# Patient Record
Sex: MALE | Race: WHITE | NOT HISPANIC OR LATINO
[De-identification: names, ages, dates, MRNs, and addresses within clinical notes are randomized per-mention and may not be internally consistent; named-entity substitution may affect disease eponyms.]

---

## 2018-12-05 ENCOUNTER — TRANSCRIPTION ENCOUNTER (OUTPATIENT)
Age: 58
End: 2018-12-05

## 2019-01-02 ENCOUNTER — TRANSCRIPTION ENCOUNTER (OUTPATIENT)
Age: 59
End: 2019-01-02

## 2019-03-19 PROBLEM — Z00.00 ENCOUNTER FOR PREVENTIVE HEALTH EXAMINATION: Status: ACTIVE | Noted: 2019-03-19

## 2019-03-21 ENCOUNTER — APPOINTMENT (OUTPATIENT)
Age: 59
End: 2019-03-21
Payer: COMMERCIAL

## 2019-03-21 VITALS
OXYGEN SATURATION: 96 % | BODY MASS INDEX: 24.91 KG/M2 | WEIGHT: 174.01 LBS | SYSTOLIC BLOOD PRESSURE: 121 MMHG | TEMPERATURE: 97.6 F | HEIGHT: 70 IN | HEART RATE: 77 BPM | DIASTOLIC BLOOD PRESSURE: 78 MMHG

## 2019-03-21 DIAGNOSIS — M79.9 SOFT TISSUE DISORDER, UNSPECIFIED: ICD-10-CM

## 2019-03-21 PROCEDURE — 99203 OFFICE O/P NEW LOW 30 MIN: CPT

## 2019-03-21 NOTE — REASON FOR VISIT
[FreeTextEntry1] : Presented for evaluation of a large soft tissue mass involving the left distal forearm

## 2019-03-21 NOTE — PHYSICAL EXAM
[FreeTextEntry1] : Physical exam revealed a healthy-looking patient in no apparent distress patient appeared to be fully alert oriented and drainage of the left upper extremity demonstrates a large fixed no mobile soft tissue mass involving the dorsal lateral and volar aspect of the distal forearm extending to the wrist area no gross and neurovascular deficit MRI scan demonstrated multilobulated soft tissue mass rubbing around the distal or not. Suggest to be a lipomatous mass. At this stage this condition was discussed with the patient was recommended to proceed with exploration wide resection soft tissue mass left distal followup

## 2019-03-21 NOTE — HISTORY OF PRESENT ILLNESS
[FreeTextEntry1] : This is the first visit of 58 years old male for the last 20 years noted to have gradually slow growing mass involving the lateral aspect of the left distal forearm wrist area recent MRI scan suggested a large multilobulated lipomatous mass

## 2022-08-11 ENCOUNTER — APPOINTMENT (OUTPATIENT)
Dept: ORTHOPEDIC SURGERY | Facility: CLINIC | Age: 62
End: 2022-08-11

## 2022-08-11 VITALS — WEIGHT: 180 LBS | BODY MASS INDEX: 25.77 KG/M2 | HEIGHT: 70 IN

## 2022-08-11 DIAGNOSIS — Z78.9 OTHER SPECIFIED HEALTH STATUS: ICD-10-CM

## 2022-08-11 DIAGNOSIS — S96.911A STRAIN OF UNSPECIFIED MUSCLE AND TENDON AT ANKLE AND FOOT LEVEL, RIGHT FOOT, INITIAL ENCOUNTER: ICD-10-CM

## 2022-08-11 PROCEDURE — 99214 OFFICE O/P EST MOD 30 MIN: CPT

## 2022-08-11 PROCEDURE — 73630 X-RAY EXAM OF FOOT: CPT | Mod: RT

## 2022-08-11 NOTE — ASSESSMENT
[FreeTextEntry1] : MRI RT foot to evaluate for occult 5th metatarsal fracture.\par Ice to affected area.\par NSAIDS prn.\par \par Discussed CAM boot immobilization, patient declines.

## 2022-08-11 NOTE — HISTORY OF PRESENT ILLNESS
[Sudden] : sudden [8] : 8 [5] : 5 [Sharp] : sharp [Constant] : constant [Household chores] : household chores [Rest] : rest [Ice] : ice [Standing] : standing [Walking] : walking [Full time] : Work status: full time [de-identified] : Pt. is a 62 year old male who presents for evaluation of his RT foot. Denies trauma. Symptoms since late July 2022. WB in supportive footwear. No previous injury/problem with RT foot. No formal treatment to date.  [] : no [FreeTextEntry1] : right foot  [FreeTextEntry3] : 07/2022 [FreeTextEntry5] : n/a  [de-identified] :

## 2022-08-12 ENCOUNTER — FORM ENCOUNTER (OUTPATIENT)
Age: 62
End: 2022-08-12

## 2022-08-13 ENCOUNTER — APPOINTMENT (OUTPATIENT)
Dept: MRI IMAGING | Facility: CLINIC | Age: 62
End: 2022-08-13

## 2022-08-13 PROCEDURE — 73718 MRI LOWER EXTREMITY W/O DYE: CPT | Mod: RT

## 2022-08-16 ENCOUNTER — APPOINTMENT (OUTPATIENT)
Dept: ORTHOPEDIC SURGERY | Facility: CLINIC | Age: 62
End: 2022-08-16

## 2022-08-16 VITALS — HEIGHT: 70 IN | BODY MASS INDEX: 25.77 KG/M2 | WEIGHT: 180 LBS

## 2022-08-16 DIAGNOSIS — S93.621D SPRAIN OF TARSOMETATARSAL LIGAMENT OF RIGHT FOOT, SUBSEQUENT ENCOUNTER: ICD-10-CM

## 2022-08-16 DIAGNOSIS — S93.629A SPRAIN OF TARSOMETATARSAL LIGAMENT OF UNSPECIFIED FOOT, INITIAL ENCOUNTER: ICD-10-CM

## 2022-08-16 PROCEDURE — 99214 OFFICE O/P EST MOD 30 MIN: CPT

## 2022-08-16 RX ORDER — NAPROXEN 500 MG/1
500 TABLET ORAL
Qty: 60 | Refills: 0 | Status: ACTIVE | COMMUNITY
Start: 2022-08-16 | End: 1900-01-01

## 2022-08-16 NOTE — DISCUSSION/SUMMARY
[de-identified] : Patient and I discussed their symptoms, RT foot pain . Discussed findings of today's exam and possible causes of patient's pain. Educated patient on their most probable diagnosis of Lisfranc sprain of RT foot. Reviewed possible courses of treatment, and we collaboratively decided best course of treatment at this time will include:\par \par 1. Immobilize foot\par 2. PT\par 3. Naproxen\par \par Instructions: Dx / Natural History\par The patient was advised of the diagnosis.  The natural history of the pathology was explained in full to the patient in layman's terms.  Several different treatment options were discussed and explained in full to the patient including the risks and benefits of both surgical and non-surgical treatments.  All questions and concerns were answered. \par \par RICE\par I explained to the patient that rest, ice, compression, and elevation would benefit them.  They may return to activity after follow-up or when they no longer have any pain.\par \par NSAIDs - OTC\par Patient is to begin over the counter oral anti-inflammatory medications on an as needed basis, as long as there are no medical contraindications.  Patient is counseled on possible GI and blood pressure side effects.\par \par Pain Guide Activities\par The patient was advised to let pain guide the gradual advancement of activities.\par \par Icing\par The patient was advised to apply ice (wrapped in a towel or protective covering) to the area daily (20 minutes at a time, 2-4X/day).\par \par Follow up in [4-6] weeks.\par \par All of the patient's questions were answered to His satisfaction. Diagnoses and potential treatments were reviewed. He agreed with the plan and would like to move forward with it. \par \par Documented by Jerod Olvera.

## 2022-08-16 NOTE — PHYSICAL EXAM
[NL (40)] : plantar flexion 40 degrees [NL 30)] : inversion 30 degrees [NL (20)] : eversion 20 degrees [5___] : [facility] 5[unfilled]/5 [2+] : posterior tibialis pulse: 2+ [Normal] : saphenous nerve sensation normal [] : patient ambulates without assistive device [Right] : right foot [Weight -] : weightbearing [FreeTextEntry3] : Minimal lateral foot swelling.  [de-identified] : Achilles insertion spur

## 2022-08-16 NOTE — DISCUSSION/SUMMARY
[de-identified] : Patient and I discussed their symptoms, RT foot pain . Discussed findings of today's exam and possible causes of patient's pain. Educated patient on their most probable diagnosis of Lisfranc sprain of RT foot. Reviewed possible courses of treatment, and we collaboratively decided best course of treatment at this time will include:\par \par 1. Immobilize foot\par 2. PT\par 3. Naproxen\par \par Instructions: Dx / Natural History\par The patient was advised of the diagnosis.  The natural history of the pathology was explained in full to the patient in layman's terms.  Several different treatment options were discussed and explained in full to the patient including the risks and benefits of both surgical and non-surgical treatments.  All questions and concerns were answered. \par \par RICE\par I explained to the patient that rest, ice, compression, and elevation would benefit them.  They may return to activity after follow-up or when they no longer have any pain.\par \par NSAIDs - OTC\par Patient is to begin over the counter oral anti-inflammatory medications on an as needed basis, as long as there are no medical contraindications.  Patient is counseled on possible GI and blood pressure side effects.\par \par Pain Guide Activities\par The patient was advised to let pain guide the gradual advancement of activities.\par \par Icing\par The patient was advised to apply ice (wrapped in a towel or protective covering) to the area daily (20 minutes at a time, 2-4X/day).\par \par Follow up in [4-6] weeks.\par \par All of the patient's questions were answered to His satisfaction. Diagnoses and potential treatments were reviewed. He agreed with the plan and would like to move forward with it. \par \par Documented by Jerod Olvera.

## 2022-08-16 NOTE — DATA REVIEWED
[MRI] : MRI [Right] : of the right [Foot] : foot [Report was reviewed and noted in the chart] : The report was reviewed and noted in the chart [I independently reviewed and interpreted images and report] : I independently reviewed and interpreted images and report [FreeTextEntry1] : 1. Moderate Lisfranc ligament sprain and mild intermetatarsal sprains between the bases of second and third \par and third and fourth digits with moderate surrounding synovitis suggesting recent traumatic injury without acute \par fracture, malalignment, muscle tear, or tendon tear.\par 2. Mild soft tissue swelling surrounds the base of the fifth metatarsal.\par 3. 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 No marrow edema in the fifth metatarsal or remaining metatarsals to suggest acute osseous injury or fracture.

## 2022-08-16 NOTE — HISTORY OF PRESENT ILLNESS
[de-identified] : The patient is a 62 year old RT hand dominant male who presents today complaining of right foot pain \par Date of Injury/Onset: mid 7/2022\par Pain:    At Rest: 2-3/10 \par With Activity:  8/10 \par Mechanism of injury: No specific cause of injury \par This is NOT a Work Related Injury being treated under Worker's Compensation.\par This is NOT an athletic injury occurring associated with an interscholastic or organized sports team.\par Quality of symptoms: Aching \par Improves with: _\par Worse with: Walking \par Treatment/Imaging: MRI & XR @ [facility]\par Out of work/sport: _, since _\par School/Sport/Position/Occupation: site super \par Additional Information: None\par \par 08/16/2022 \par \par YARELIS is presenting today for followup. Pain and symptoms are similar to the previous visit. He denies any numbness/tingling/fevers/chills. He underwent an MRI since last visit, and is here to discuss the imaging results. Pt of Dr. Prabhakar

## 2022-08-16 NOTE — PHYSICAL EXAM
[NL (40)] : plantar flexion 40 degrees [NL 30)] : inversion 30 degrees [NL (20)] : eversion 20 degrees [5___] : [facility] 5[unfilled]/5 [2+] : posterior tibialis pulse: 2+ [Normal] : saphenous nerve sensation normal [] : patient ambulates without assistive device [Right] : right foot [Weight -] : weightbearing [FreeTextEntry3] : Minimal lateral foot swelling.  [de-identified] : Achilles insertion spur

## 2022-08-16 NOTE — HISTORY OF PRESENT ILLNESS
[de-identified] : The patient is a 62 year old RT hand dominant male who presents today complaining of right foot pain \par Date of Injury/Onset: mid 7/2022\par Pain:    At Rest: 2-3/10 \par With Activity:  8/10 \par Mechanism of injury: No specific cause of injury \par This is NOT a Work Related Injury being treated under Worker's Compensation.\par This is NOT an athletic injury occurring associated with an interscholastic or organized sports team.\par Quality of symptoms: Aching \par Improves with: _\par Worse with: Walking \par Treatment/Imaging: MRI & XR @ [facility]\par Out of work/sport: _, since _\par School/Sport/Position/Occupation: site super \par Additional Information: None\par \par 08/16/2022 \par \par YARELIS is presenting today for followup. Pain and symptoms are similar to the previous visit. He denies any numbness/tingling/fevers/chills. He underwent an MRI since last visit, and is here to discuss the imaging results. Pt of Dr. Prabhakar

## 2022-09-27 ENCOUNTER — APPOINTMENT (OUTPATIENT)
Dept: ORTHOPEDIC SURGERY | Facility: CLINIC | Age: 62
End: 2022-09-27

## 2022-10-23 ENCOUNTER — NON-APPOINTMENT (OUTPATIENT)
Age: 62
End: 2022-10-23

## 2022-11-01 ENCOUNTER — NON-APPOINTMENT (OUTPATIENT)
Age: 62
End: 2022-11-01

## 2022-11-06 ENCOUNTER — NON-APPOINTMENT (OUTPATIENT)
Age: 62
End: 2022-11-06

## 2024-07-03 ENCOUNTER — EMERGENCY (EMERGENCY)
Facility: HOSPITAL | Age: 64
LOS: 1 days | Discharge: HOME | End: 2024-07-03
Attending: EMERGENCY MEDICINE
Payer: SELF-PAY

## 2024-07-03 VITALS
HEIGHT: 70 IN | OXYGEN SATURATION: 95 % | TEMPERATURE: 98 F | HEART RATE: 82 BPM | DIASTOLIC BLOOD PRESSURE: 101 MMHG | WEIGHT: 174.39 LBS | SYSTOLIC BLOOD PRESSURE: 161 MMHG | RESPIRATION RATE: 20 BRPM

## 2024-07-03 PROCEDURE — 99283 EMERGENCY DEPT VISIT LOW MDM: CPT | Mod: 25

## 2024-07-03 PROCEDURE — 99284 EMERGENCY DEPT VISIT MOD MDM: CPT

## 2024-07-03 PROCEDURE — 93005 ELECTROCARDIOGRAM TRACING: CPT

## 2024-07-03 PROCEDURE — 93010 ELECTROCARDIOGRAM REPORT: CPT

## 2024-07-03 RX ORDER — METHOCARBAMOL 500 MG
2 TABLET ORAL
Qty: 18 | Refills: 0
Start: 2024-07-03 | End: 2024-07-05

## 2024-07-03 RX ORDER — METHOCARBAMOL 500 MG
1000 TABLET ORAL ONCE
Refills: 0 | Status: COMPLETED | OUTPATIENT
Start: 2024-07-03 | End: 2024-07-03

## 2024-07-03 RX ADMIN — Medication 1000 MILLIGRAM(S): at 21:38

## 2024-07-03 RX ADMIN — Medication 600 MILLIGRAM(S): at 21:38

## 2024-07-08 ENCOUNTER — APPOINTMENT (OUTPATIENT)
Dept: ORTHOPEDIC SURGERY | Facility: CLINIC | Age: 64
End: 2024-07-08
Payer: COMMERCIAL

## 2024-07-08 VITALS — HEIGHT: 70 IN | BODY MASS INDEX: 25.77 KG/M2 | WEIGHT: 180 LBS

## 2024-07-08 DIAGNOSIS — S16.1XXA STRAIN OF MUSCLE, FASCIA AND TENDON AT NECK LEVEL, INITIAL ENCOUNTER: ICD-10-CM

## 2024-07-08 DIAGNOSIS — S23.9XXA SPRAIN OF UNSPECIFIED PARTS OF THORAX, INITIAL ENCOUNTER: ICD-10-CM

## 2024-07-08 PROCEDURE — 72040 X-RAY EXAM NECK SPINE 2-3 VW: CPT

## 2024-07-08 PROCEDURE — 72070 X-RAY EXAM THORAC SPINE 2VWS: CPT

## 2024-07-08 PROCEDURE — 99204 OFFICE O/P NEW MOD 45 MIN: CPT

## 2024-07-22 ENCOUNTER — TRANSCRIPTION ENCOUNTER (OUTPATIENT)
Age: 64
End: 2024-07-22

## 2024-08-05 ENCOUNTER — APPOINTMENT (OUTPATIENT)
Dept: ORTHOPEDIC SURGERY | Facility: CLINIC | Age: 64
End: 2024-08-05